# Patient Record
Sex: MALE | Race: WHITE | NOT HISPANIC OR LATINO | Employment: OTHER | URBAN - NONMETROPOLITAN AREA
[De-identification: names, ages, dates, MRNs, and addresses within clinical notes are randomized per-mention and may not be internally consistent; named-entity substitution may affect disease eponyms.]

---

## 2019-09-17 ENCOUNTER — TRANSCRIBE ORDERS (OUTPATIENT)
Dept: ADMINISTRATIVE | Facility: HOSPITAL | Age: 69
End: 2019-09-17

## 2019-09-17 DIAGNOSIS — N50.9: Primary | ICD-10-CM

## 2019-09-18 ENCOUNTER — APPOINTMENT (OUTPATIENT)
Dept: ULTRASOUND IMAGING | Facility: HOSPITAL | Age: 69
End: 2019-09-18

## 2021-02-22 ENCOUNTER — OFFICE VISIT (OUTPATIENT)
Dept: CARDIOLOGY | Facility: CLINIC | Age: 71
End: 2021-02-22

## 2021-02-22 VITALS
SYSTOLIC BLOOD PRESSURE: 158 MMHG | DIASTOLIC BLOOD PRESSURE: 92 MMHG | BODY MASS INDEX: 25.87 KG/M2 | HEART RATE: 80 BPM | TEMPERATURE: 98 F | WEIGHT: 191 LBS | HEIGHT: 72 IN

## 2021-02-22 DIAGNOSIS — Z01.818 PRE-OP EVALUATION: ICD-10-CM

## 2021-02-22 DIAGNOSIS — I10 ESSENTIAL HYPERTENSION: ICD-10-CM

## 2021-02-22 DIAGNOSIS — E89.0 POSTOPERATIVE HYPOTHYROIDISM: ICD-10-CM

## 2021-02-22 DIAGNOSIS — R42 DIZZINESS: ICD-10-CM

## 2021-02-22 DIAGNOSIS — Z85.819 HISTORY OF THROAT CANCER: ICD-10-CM

## 2021-02-22 DIAGNOSIS — R19.00 PELVIC MASS: Primary | ICD-10-CM

## 2021-02-22 DIAGNOSIS — I25.6 SILENT MYOCARDIAL ISCHEMIA: ICD-10-CM

## 2021-02-22 DIAGNOSIS — R94.31 ABNORMAL EKG: ICD-10-CM

## 2021-02-22 DIAGNOSIS — E78.00 HYPERCHOLESTEREMIA: ICD-10-CM

## 2021-02-22 PROCEDURE — 93000 ELECTROCARDIOGRAM COMPLETE: CPT | Performed by: INTERNAL MEDICINE

## 2021-02-22 PROCEDURE — 99204 OFFICE O/P NEW MOD 45 MIN: CPT | Performed by: INTERNAL MEDICINE

## 2021-02-22 RX ORDER — ERGOCALCIFEROL (VITAMIN D2) 50 MCG
CAPSULE ORAL DAILY
COMMUNITY

## 2021-02-22 RX ORDER — LISINOPRIL 20 MG/1
TABLET ORAL
COMMUNITY

## 2021-02-22 RX ORDER — LEVOTHYROXINE SODIUM 88 UG/1
TABLET ORAL
COMMUNITY

## 2021-02-22 RX ORDER — FINASTERIDE 5 MG/1
5 TABLET, FILM COATED ORAL DAILY
COMMUNITY

## 2021-02-22 RX ORDER — TAMSULOSIN HYDROCHLORIDE 0.4 MG/1
1 CAPSULE ORAL DAILY
COMMUNITY

## 2021-02-22 RX ORDER — HYDROXYZINE 50 MG/1
50 TABLET, FILM COATED ORAL 3 TIMES DAILY PRN
COMMUNITY

## 2021-02-22 RX ORDER — ATORVASTATIN CALCIUM 80 MG/1
80 TABLET, FILM COATED ORAL DAILY
COMMUNITY

## 2021-02-22 RX ORDER — HYDROCODONE BITARTRATE AND ACETAMINOPHEN 5; 325 MG/1; MG/1
1 TABLET ORAL 2 TIMES DAILY
COMMUNITY

## 2021-02-22 RX ORDER — CITALOPRAM 40 MG/1
TABLET ORAL
COMMUNITY

## 2021-02-22 NOTE — PROGRESS NOTES
"Chief Complaint   Patient presents with   • Establish [facility]      [facility] needing cardiac clearance, \" remove tumor from pelvis\"   • Cardiac history     recent echo at [facility], report in door, pt reports history of carotid stenosis, testing in door.    • Dizziness     does have some juan dizziness but relates to usually being dehydrated.   • Aspirin     does not take a daily aspirin   • Hypertension     pt only taking lisinopril PRN, reports not taking it for about a month. Checks BP 3-4 times a week, Has been running \"around normal\". BP drops at times due to dehydration.         CARDIAC COMPLAINTS  dyspnea, Dizziness, fatigue and Pre op Clearance      Subjective   Rob Domínguez is a 70 y.o. male came in today with his wife for his initial cardiac evaluation.  He has history of hypertension, hypercholesterolemia who also had history of laryngeal cancer for which he has undergone surgery in the form of laryngectomy with tracheostomy tube placement.  He also has undergone radiation and chemotherapy.  He apparently had part of his thyroid removed and after the radiation noted to have significant hypothyroidism and has been treated with thyroid supplements.  In September of last year he was involved in an accident and after which he started having some pain in the left part of the pelvis and was seen at the urgent care who had an ultrasound done and found to have questionable mass in the pelvis.  He then underwent a CT scan which showed questionable mass in the pelvis and also moderate size mass in the pelvis to the right of the midline displacing the urinary bladder posteriorly.  He did undergo cystoscopy by urologist and found no significant mass.  He is now scheduled to undergo surgery on the pelvic mass.  He denies having any kind of hematuria or dysuria.  He does have episodes of dizziness on and off which occurs mostly when he gets dehydrated.  He does have some shortness of breath which occurs " mostly on exertion.  He did undergo an echocardiogram at the hospital which was essentially normal and he also noted to have moderate disease involving the left ICA.  He is now referred for pre op evaluation prior to the surgery.  He denies having any chest pain.  He does have shortness of breath as stated above.  He denies having any palpitation.  He does take medication for blood pressure but does not take it regularly.  Apparently sometime when he get dehydrated he become hypotensive.  He has history of smoking but did quit in .  His brother has heart disease and also history of cancer.  His father  with heart disease and so was his mother.    Past Surgical History:   Procedure Laterality Date   • ECHO - CONVERTED  2021    @ [facility].- EF 60%.Trace MR   • US CAROTID UNILATERAL  2020    50-60% (L) ICA       Current Outpatient Medications   Medication Sig Dispense Refill   • Ascorbic Acid (VITAMIN C PO) Take 1,000 mg by mouth Daily.     • atorvastatin (LIPITOR) 80 MG tablet Take 80 mg by mouth Daily.     • B Complex Vitamins (VITAMIN B COMPLEX PO) Take  by mouth Daily.     • citalopram (CeleXA) 40 MG tablet 1/2 tab daily     • finasteride (PROSCAR) 5 MG tablet Take 5 mg by mouth Daily.     • HYDROcodone-acetaminophen (NORCO) 5-325 MG per tablet Take 1 tablet by mouth 2 (two) times a day.     • hydrOXYzine (ATARAX) 50 MG tablet Take 50 mg by mouth 3 (Three) Times a Day As Needed for Itching.     • ipratropium-albuterol (COMBIVENT RESPIMAT)  MCG/ACT inhaler Inhale 1 puff 4 (Four) Times a Day As Needed for Wheezing.     • levothyroxine (Synthroid) 88 MCG tablet 2 tabs daily     • lisinopril (PRINIVIL,ZESTRIL) 20 MG tablet Will take from 1/2 tab to 1 tab PRN according to BP reading     • tamsulosin (FLOMAX) 0.4 MG capsule 24 hr capsule Take 1 capsule by mouth Daily.     • Vitamin D, Ergocalciferol, 50 MCG (2000 UT) capsule Take  by mouth Daily.       No current facility-administered  medications for this visit.            ALLERGIES:  Patient has no known allergies.    Past Medical History:   Diagnosis Date   • Anxiety    • COPD (chronic obstructive pulmonary disease) (CMS/HCC)    • Hyperlipidemia    • Hypertension    • Laryngeal cancer (CMS/HCC)     s/p laryngectomy with tracheostomy tube       Social History     Tobacco Use   Smoking Status Former Smoker   • Packs/day: 1.00   • Years: 15.00   • Pack years: 15.00   • Types: Cigarettes   • Quit date:    • Years since quittin.1   Smokeless Tobacco Never Used          Family History   Problem Relation Age of Onset   • Heart attack Mother         MI    • Heart attack Father         multiple MI's   • Cancer Father    • No Known Problems Sister    • Cancer Brother    • Heart disease Brother         CABG   • No Known Problems Daughter    • No Known Problems Daughter    • No Known Problems Son        Review of Systems   Constitution: Positive for malaise/fatigue. Negative for decreased appetite.   HENT: Negative for congestion and sore throat.    Eyes: Negative for blurred vision.   Cardiovascular: Positive for dyspnea on exertion. Negative for chest pain and palpitations.   Respiratory: Positive for shortness of breath. Negative for snoring.    Endocrine: Negative for cold intolerance and heat intolerance.   Hematologic/Lymphatic: Negative for adenopathy. Does not bruise/bleed easily.   Skin: Negative for itching, nail changes and skin cancer.   Musculoskeletal: Negative for arthritis and myalgias.   Gastrointestinal: Negative for abdominal pain, dysphagia and heartburn.   Genitourinary: Negative for bladder incontinence and frequency.   Neurological: Positive for dizziness. Negative for light-headedness, seizures and vertigo.   Psychiatric/Behavioral: Negative for altered mental status.   Allergic/Immunologic: Negative for environmental allergies and hives.       Diabetes- No  Thyroid- abnormal    Objective     /92 (BP Location: Right  "arm)   Pulse 80   Temp 98 °F (36.7 °C)   Ht 182.9 cm (72\")   Wt 86.6 kg (191 lb)   BMI 25.90 kg/m²     Vitals signs and nursing note reviewed.   Constitutional:       Appearance: Healthy appearance. Not in distress.   Eyes:      Conjunctiva/sclera: Conjunctivae normal.      Pupils: Pupils are equal, round, and reactive to light.   HENT:      Head: Normocephalic.   Neck:      Musculoskeletal: Normal range of motion and neck supple.   Pulmonary:      Effort: Pulmonary effort is normal.      Breath sounds: Normal breath sounds.   Cardiovascular:      PMI at left midclavicular line. Normal rate. Regular rhythm.      Murmurs: There is a grade 2/6 mid frequency systolic murmur at the apex.   Abdominal:      General: Bowel sounds are normal.      Palpations: Abdomen is soft.   Musculoskeletal: Normal range of motion.   Skin:     General: Skin is warm and dry.   Neurological:      Mental Status: Alert, oriented to person, place, and time and oriented to person, place and time.           ECG 12 Lead    Date/Time: 2/22/2021 12:51 PM  Performed by: Rashard Lozano MD  Authorized by: Rashard Lozano MD   Previous ECG: no previous ECG available  Rhythm: sinus rhythm  Rate: normal  QRS axis: normal  Other findings: T wave abnormality    Clinical impression: abnormal EKG              Assessment/Plan   Patient's Body mass index is 25.9 kg/m². BMI is above normal parameters. Recommendations include: nutrition counseling.     Diagnoses and all orders for this visit:    1. Pelvic mass (Primary)    2. Essential hypertension    3. Hypercholesteremia    4. History of throat cancer    5. Dizziness    6. Pre-op evaluation  -     Stress Test With Myocardial Perfusion One Day; Future    7. Silent myocardial ischemia  -     Stress Test With Myocardial Perfusion One Day; Future    8. Abnormal EKG  -     Stress Test With Myocardial Perfusion One Day; Future    9. Postoperative hypothyroidism    At baseline his heart rate is " stable.  His blood pressure is slightly elevated.  He has not taken his medications today.  His EKG shows sinus rhythm with diffuse nonspecific T wave changes.  His clinical examination reveals a BMI of 26.  He does have carotid bruit on the left side and a short systolic murmur at the mitral area.    Regarding the pelvic mass, he needs to undergo surgery and is going to be done as an general anesthesia.  He does have few risk factor for coronary artery disease which includes hypertension, hypercholesterolemia and a family history of ischemic heart disease.  He denies having any anginal symptoms but he does not ambulate regularly.    Regarding his hypertension, it is still slightly elevated but he has not taken the medication.  I talked to him and his wife in detail about the importance of taking the medicine regularly and is advised to start taking it immediately goes on.    Regarding his hypercholesterolemia, he is taking Lipitor.  He is taking a high dose of Lipitor.  He needs his lipids rechecked along with the LFT.    Regarding his history of throat cancer, he has undergone surgery as well as chemotherapy.  [facility]  Regarding the dizziness, it could be secondary to his    Episodes of dehydration.  He does have moderate disease of the carotids and need to be monitored very closely.  He will need his ultrasound of his carotids repeated again in 1 year.    Regarding the pre op evaluation, he does have multiple risk factors as stated above.  I did schedule him to undergo a stress test in the form of modified Elbert to rule out any silent ischemia    Regarding the hypothyroidism, he is taking thyroid supplements and [facility]    Regarding advanced directive, I talked to him and his wife regarding that.  Apparently they do not have anything plan.  I gave them booklets regarding living will and power of .    Based on the results, further recommendations  will be made.               Electronically signed by Rashard Lozano MD February 22, 2021 12:36 EST

## 2021-02-23 ENCOUNTER — HOSPITAL ENCOUNTER (OUTPATIENT)
Dept: CARDIOLOGY | Facility: HOSPITAL | Age: 71
Discharge: HOME | End: 2021-02-23

## 2021-02-23 DIAGNOSIS — I25.6 SILENT MYOCARDIAL ISCHEMIA: ICD-10-CM

## 2021-02-23 DIAGNOSIS — Z01.818 PRE-OP EVALUATION: ICD-10-CM

## 2021-02-23 DIAGNOSIS — R94.31 ABNORMAL EKG: ICD-10-CM

## 2021-02-23 LAB
BH CV STRESS DURATION MIN STAGE 1: 3
BH CV STRESS DURATION SEC STAGE 1: 0
BH CV STRESS GRADE STAGE 1: 0
BH CV STRESS METS STAGE 1: 2.3
BH CV STRESS PROTOCOL 1: NORMAL
BH CV STRESS RECOVERY BP: NORMAL MMHG
BH CV STRESS RECOVERY HR: 85 BPM
BH CV STRESS SPEED STAGE 1: 1.7
BH CV STRESS STAGE 1: 1
LV EF NUC BP: 51 %
MAXIMAL PREDICTED HEART RATE: 148 BPM
PERCENT MAX PREDICTED HR: 77.03 %
STRESS BASELINE BP: NORMAL MMHG
STRESS BASELINE HR: 70 BPM
STRESS PERCENT HR: 91 %
STRESS POST ESTIMATED WORKLOAD: 4.6 METS
STRESS POST EXERCISE DUR MIN: 6 MIN
STRESS POST EXERCISE DUR SEC: 15 SEC
STRESS POST PEAK BP: NORMAL MMHG
STRESS POST PEAK HR: 114 BPM
STRESS TARGET HR: 126 BPM

## 2021-02-23 PROCEDURE — 93017 CV STRESS TEST TRACING ONLY: CPT

## 2021-02-23 PROCEDURE — A9500 TC99M SESTAMIBI: HCPCS | Performed by: INTERNAL MEDICINE

## 2021-02-23 PROCEDURE — 78452 HT MUSCLE IMAGE SPECT MULT: CPT

## 2021-02-23 PROCEDURE — 78452 HT MUSCLE IMAGE SPECT MULT: CPT | Performed by: INTERNAL MEDICINE

## 2021-02-23 PROCEDURE — 0 TECHNETIUM SESTAMIBI: Performed by: INTERNAL MEDICINE

## 2021-02-23 PROCEDURE — 93018 CV STRESS TEST I&R ONLY: CPT | Performed by: INTERNAL MEDICINE

## 2021-02-23 PROCEDURE — 93016 CV STRESS TEST SUPVJ ONLY: CPT | Performed by: NURSE PRACTITIONER

## 2021-02-23 RX ADMIN — TECHNETIUM TC 99M SESTAMIBI 1 DOSE: 1 INJECTION INTRAVENOUS at 13:06

## 2021-02-23 RX ADMIN — TECHNETIUM TC 99M SESTAMIBI 1 DOSE: 1 INJECTION INTRAVENOUS at 13:05

## 2021-03-03 ENCOUNTER — TELEPHONE (OUTPATIENT)
Dept: CARDIOLOGY | Facility: CLINIC | Age: 71
End: 2021-03-03

## 2021-06-07 ENCOUNTER — TELEPHONE (OUTPATIENT)
Dept: CARDIOLOGY | Facility: CLINIC | Age: 71
End: 2021-06-07

## 2021-06-07 NOTE — TELEPHONE ENCOUNTER
Received fax from Dr. Barreto for cardiac clearance for patient to have a ventral hernia repair. According to our records, I do not see where patient is on any blood thinners or has had any stenting.         Fax [phone]